# Patient Record
(demographics unavailable — no encounter records)

---

## 2024-12-19 NOTE — PHYSICAL EXAM

## 2024-12-19 NOTE — HISTORY OF PRESENT ILLNESS
[Eats meals with family] : eats meals with family [Has family members/adults to turn to for help] : has family members/adults to turn to for help [Grade: ____] : Grade: [unfilled] [Normal Performance] : normal performance [Normal Behavior/Attention] : normal behavior/attention [Normal Homework] : normal homework [Has friends] : has friends [Has interests/participates in community activities/volunteers] : has interests/participates in community activities/volunteers. [Yes] : Patient goes to dentist yearly [Sleep Concerns] : sleep concerns [Eats regular meals including adequate fruits and vegetables] : eats regular meals including adequate fruits and vegetables [Drinks non-sweetened liquids] : drinks non-sweetened liquids  [NO] : No [Has concerns about body or appearance] : does not have concerns about body or appearance [Uses electronic nicotine delivery system] : does not use electronic nicotine delivery system [Exposure to electronic nicotine delivery system] : no exposure to electronic nicotine delivery system [Uses tobacco] : does not use tobacco [Exposure to tobacco] : no exposure to tobacco [Uses drugs] : does not use drugs  [Exposure to drugs] : no exposure to drugs [Drinks alcohol] : does not drink alcohol [Exposure to alcohol] : no exposure to alcohol [FreeTextEntry8] : last period was 10/2024.  Previously it was regular.  She is sexually active, uses condoms every time except once before the missed period.  She has also been very stressed.  [de-identified] : lives at home with mom [de-identified] : In college remotely for  and then hoping to go to veterinary school. 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  [de-identified] : She denies depression but has been very stressed due to her school work, work schedule, and lack of sleep during regular hours. JONELLE& score: 7 consistnet with mild anxiety [FreeTextEntry1] : last well visit 12/2022.  She takes Dupixent regularly subQ. She is supposed to take it every 2 weeks but she takes in every 4 weeks due to a fear of needles. last dose was yesterday.   last migraine was 2-3 weeks ago, last took rizatriptan over 1 month ago.  Albuterol PRN.  She has had a cold for last 2-3 days.  She is using the albuterol every 4 hours for the last 2-3 days.  she last needed it 2-3 weeks ago.  Peanut and pork allergies.  recently she broke out in hives from seafood.    she is having some knee pain. She has hip dysplasia that has been followed int he past by an outside orthopedist.

## 2024-12-19 NOTE — HISTORY OF PRESENT ILLNESS
[Eats meals with family] : eats meals with family [Has family members/adults to turn to for help] : has family members/adults to turn to for help [Grade: ____] : Grade: [unfilled] [Normal Performance] : normal performance [Normal Behavior/Attention] : normal behavior/attention [Normal Homework] : normal homework [Has friends] : has friends [Has interests/participates in community activities/volunteers] : has interests/participates in community activities/volunteers. [Yes] : Patient goes to dentist yearly [Sleep Concerns] : sleep concerns [Eats regular meals including adequate fruits and vegetables] : eats regular meals including adequate fruits and vegetables [Drinks non-sweetened liquids] : drinks non-sweetened liquids  [NO] : No [Has concerns about body or appearance] : does not have concerns about body or appearance [Uses electronic nicotine delivery system] : does not use electronic nicotine delivery system [Exposure to electronic nicotine delivery system] : no exposure to electronic nicotine delivery system [Exposure to tobacco] : no exposure to tobacco [Uses tobacco] : does not use tobacco [Uses drugs] : does not use drugs  [Exposure to drugs] : no exposure to drugs [Drinks alcohol] : does not drink alcohol [Exposure to alcohol] : no exposure to alcohol [FreeTextEntry8] : last period was 10/2024.  Previously it was regular.  She is sexually active, uses condoms every time except once before the missed period.  She has also been very stressed.  [de-identified] : lives at home with mom [de-identified] : In college remotely for  and then hoping to go to veterinary school. 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  [de-identified] : She denies depression but has been very stressed due to her school work, work schedule, and lack of sleep during regular hours. JONELLE& score: 7 consistnet with mild anxiety [FreeTextEntry1] : last well visit 12/2022.  She takes Dupixent regularly subQ. She is supposed to take it every 2 weeks but she takes in every 4 weeks due to a fear of needles. last dose was yesterday.   last migraine was 2-3 weeks ago, last took rizatriptan over 1 month ago.  Albuterol PRN.  She has had a cold for last 2-3 days.  She is using the albuterol every 4 hours for the last 2-3 days.  she last needed it 2-3 weeks ago.  Peanut and pork allergies.  recently she broke out in hives from seafood.    she is having some knee pain. She has hip dysplasia that has been followed int he past by an outside orthopedist.

## 2024-12-19 NOTE — DISCUSSION/SUMMARY
[Normal Growth] : growth [Normal Development] : development  [No Elimination Concerns] : elimination [Continue Regimen] : feeding [Normal Sleep Pattern] : sleep [Anticipatory Guidance Given] : Anticipatory guidance addressed as per the history of present illness section [Physical Growth and Development] : physical growth and development [Social and Academic Competence] : social and academic competence [Emotional Well-Being] : emotional well-being [Risk Reduction] : risk reduction [Violence and Injury Prevention] : violence and injury prevention [No Vaccines] : no vaccines needed [No Medications] : ~He/She~ is not on any medications [Patient] : patient [Full Activity without restrictions including Physical Education & Athletics] : Full Activity without restrictions including Physical Education & Athletics [FreeTextEntry1] :  18 year old for well care with amenorrhea x3 months after unprotected sex with her boyfriend.   Urine pregnancy test negative.  will send to serum HCG at lab. discussed safe sex, other STI screening declined.  Also discussed stress/anxiety related to her schedule and how that can impact her body.  Advised evaluation with GYN.   return to ortho for knee pain discussed importance of taking Dupixent as prescribed. Schedule follow-up with allergy and dermatology.   Continue balanced diet with all food groups. Brush teeth twice a day with toothbrush. Recommend visit to dentist. Maintain consistent daily routines and sleep schedule. Encourage physical activity.  Social Determinants of Health domains were screened and scored.   labs to be drawn today: HCG, CBC, lipids, TSH, FT4, HgA1c   Return 1 year for routine well child check.

## 2024-12-19 NOTE — RISK ASSESSMENT

## 2024-12-19 NOTE — RISK ASSESSMENT

## 2025-04-30 NOTE — ASSESSMENT
[FreeTextEntry1] : # Atopic dermatitis - chronic, stable today with  Current BSA 5% ---  self-restarted dupixent 9/2023, (prev well-controlled on Dupixent but stopped due to needle phobia) ---- s/p rinvoq 5/2023-9/2023, stopped due to breakthrough rashes (?photodis) and weekly URIs ---- history of extensive systemic allergies s/p immunotherapy/desensitization w/ A/I --- Previous patch testing 2018 (100 patches) showed positive reactions to: Nickel, Cobalt, PPD, Carba Mix, Disperse Blue.  ---- American Core Series (80 patches) 8/2022 with 2 positive rxns: (1) cobalt (2) carmine -- s/p photo patch testing with [facility], recommended stopping allergy medications which she tried, helped but had to restart given in vet school  PLAN: - c/w Dupixent 300 mg subQ every 2 weeks  Risks of medication again reviewed including but not limited to: increased risk of injection site reaction, hypersensitivity reaction, conjunctivitis, keratitis, cold sores. Pt advised to avoid live vaccines while on this medication. - triamcinolone 0.1% ointment BID PRN to AAs, no longer than 2 weeks at a time SED including atrophy, dyspigmentation, telangiectasias, striae. Proper use reviewed including only using to affected area and avoidance of prolonged use. - Encouraged liberal sun protection daily, patient notes she reacts to many sunscreens--recommend EltaMD, Tizo, or LRP tinted mineral  refilled zyrtec per pt request  RTC 6 mos

## 2025-04-30 NOTE — END OF VISIT
[FreeTextEntry3] : All medical record entries made by the Scribe were at my, Angel Luis Renee MD, direction and personally dictated by me on 04/30/2025. I have reviewed the chart and agree that the record accurately reflects my personal performance of the history, physical exam, assessment and plan. I have also personally directed, reviewed, and agreed with the chart. [Time Spent: ___ minutes] : I have spent [unfilled] minutes of time on the encounter which excludes teaching and separately reported services.

## 2025-04-30 NOTE — HISTORY OF PRESENT ILLNESS
[FreeTextEntry1] : Atopic Dermatitis f/u [de-identified] : Ms. MAYRA DIAZ is a 18 y/o F with hx of asthma and allergies here for f/u, accompanied by mom Deep   # Longstanding AD with component of photosensitive dermatitis LV 4/2024. Today, notes she is stable on dupixent and flaring on the arms and neck. Uses triamcinolone for a day at a time PRN. Rash associated with sun did recur last week, applied topical steroids after which it resolved.  Did have photopatch testing with [facility] since LV, notes they recommended stopping allergy medication as that may have been photosensitizing--she did this for a short period which helped but had to restart it as she is in vet school and has pet allergies  HX:  Prev on Rinvoq 15mg PO daily (had needle phobia while on dupi which she did well on, started 5/15/2023), however she self dc'ed this ~ 1-1.5 months ago as she was "breaking out" all over face and body. History suggests hives which she has gotten in the past from A/I immunotherapy, however she was not receiving these anymore but continued to flare. There seemed to be a photosensitive component to some of these breakouts (would happen after spending time outdoors, mostly face and neck) and so A/I recommended stopping rinvoq due to rare reports of photosensitovoty. She was also getting a URI every week.   She continues to develop a rash on face and dorsal hands that seems to be triggered by the sun and follows the exact line of the sweatshirt she was wearing. Using cureology sunscreen which is zinc oxide based. . She is adamant about avoiding contact allergens including medical gloves (tested positive to rubber accelerators). 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   - In 10/23 she self-restarted dupixent 300mg (only 300mg loading) and injects when she flares - continues to inject about every 4 weeks. Using TAC on face, no other topicals. Flaring only on AC fossa today. Has run out of Dupixent. Spoke w [facility] abt photo testing, was told to hold Dupi for 6 weeks. Would like to schedule an appt with them.   Previous patch testing 2018 (100 patches) showed positive reactions to: Nickel, Cobalt, PPD, Carba Mix, Disperse Blue.  American Core Series (80 patches) 8/2022 with 2 positive rxns: (1) cobalt (2) carmine.

## 2025-04-30 NOTE — PHYSICAL EXAM
[Alert] : alert [Oriented x 3] : ~L oriented x 3 [Well Nourished] : well nourished [Declined] : declined [FreeTextEntry3] : eczematous plaques in b/l AC fossae and neck forehead clear

## 2025-04-30 NOTE — DISCUSSION/SUMMARY
[de-identified] : We discussed further treatment options. She continues to have pain despite the surgery and physical therapy. I have recommended obtaining updated imaging of her upper spine with MRI and CT scan. Follow up afterwards.

## 2025-04-30 NOTE — PHYSICAL EXAM
[de-identified] : Examination of the lumbar spine reveals no midline tenderness palpation, step-offs, or skin lesions.  Healed lumbar incision.  Decreased range of motion with respect to flexion, extension, lateral bending, and rotation. No tenderness to palpation of the sciatic notch. No tenderness palpation of the bilateral greater trochanters. No pain with passive internal/external rotation of the hips. No instability of bilateral lower extremities.  Negative ROBERT. Negative straight leg raise bilaterally. No bowstring. Negative femoral stretch. 5 out of 5 iliopsoas, hip abductors, hips adductors, quadriceps, hamstrings, gastrocsoleus, tibialis anterior, extensor hallucis longus, peroneals. Grossly intact sensation to light touch bilateral lower extremities. 1+ patellar and Achilles reflexes. Downgoing Babinski. No clonus. Intact proprioception. Palpable pulses. No skin lesion and no edema on the right and left lower extremities. [de-identified] : Review of her lumbar X-rays reveals prior L5 pars repair.

## 2025-04-30 NOTE — ADDENDUM
[FreeTextEntry1] : I, Nicolas Acosta, documented this note as a scribe on behalf of Angel Luis Renee MD on 04/30/2025.

## 2025-04-30 NOTE — HISTORY OF PRESENT ILLNESS
[FreeTextEntry1] : Atopic Dermatitis f/u [de-identified] : Ms. MAYRA DIAZ is a 18 y/o F with hx of asthma and allergies here for f/u, accompanied by mom Deep   # Longstanding AD with component of photosensitive dermatitis LV 4/2024. Today, notes she is stable on dupixent and flaring on the arms and neck. Uses triamcinolone for a day at a time PRN. Rash associated with sun did recur last week, applied topical steroids after which it resolved.  Did have photopatch testing with [facility] since LV, notes they recommended stopping allergy medication as that may have been photosensitizing--she did this for a short period which helped but had to restart it as she is in vet school and has pet allergies  HX:  Prev on Rinvoq 15mg PO daily (had needle phobia while on dupi which she did well on, started 5/15/2023), however she self dc'ed this ~ 1-1.5 months ago as she was "breaking out" all over face and body. History suggests hives which she has gotten in the past from A/I immunotherapy, however she was not receiving these anymore but continued to flare. There seemed to be a photosensitive component to some of these breakouts (would happen after spending time outdoors, mostly face and neck) and so A/I recommended stopping rinvoq due to rare reports of photosensitovoty. She was also getting a URI every week.   She continues to develop a rash on face and dorsal hands that seems to be triggered by the sun and follows the exact line of the sweatshirt she was wearing. Using cureology sunscreen which is zinc oxide based. . She is adamant about avoiding contact allergens including medical gloves (tested positive to rubber accelerators). 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   - In 10/23 she self-restarted dupixent 300mg (only 300mg loading) and injects when she flares - continues to inject about every 4 weeks. Using TAC on face, no other topicals. Flaring only on AC fossa today. Has run out of Dupixent. Spoke w [facility] abt photo testing, was told to hold Dupi for 6 weeks. Would like to schedule an appt with them.   Previous patch testing 2018 (100 patches) showed positive reactions to: Nickel, Cobalt, PPD, Carba Mix, Disperse Blue.  American Core Series (80 patches) 8/2022 with 2 positive rxns: (1) cobalt (2) carmine.

## 2025-04-30 NOTE — HISTORY OF PRESENT ILLNESS
[FreeTextEntry1] : Atopic Dermatitis f/u [de-identified] : Ms. MAYRA DIAZ is a 20 y/o F with hx of asthma and allergies here for f/u, accompanied by mom Deep   # Longstanding AD with component of photosensitive dermatitis LV 4/2024. Today, notes she is stable on dupixent and flaring on the arms and neck. Uses triamcinolone for a day at a time PRN. Rash associated with sun did recur last week, applied topical steroids after which it resolved.  Did have photopatch testing with [facility] since LV, notes they recommended stopping allergy medication as that may have been photosensitizing--she did this for a short period which helped but had to restart it as she is in vet school and has pet allergies  HX:  Prev on Rinvoq 15mg PO daily (had needle phobia while on dupi which she did well on, started 5/15/2023), however she self dc'ed this ~ 1-1.5 months ago as she was "breaking out" all over face and body. History suggests hives which she has gotten in the past from A/I immunotherapy, however she was not receiving these anymore but continued to flare. There seemed to be a photosensitive component to some of these breakouts (would happen after spending time outdoors, mostly face and neck) and so A/I recommended stopping rinvoq due to rare reports of photosensitovoty. She was also getting a URI every week.   She continues to develop a rash on face and dorsal hands that seems to be triggered by the sun and follows the exact line of the sweatshirt she was wearing. Using cureology sunscreen which is zinc oxide based. . She is adamant about avoiding contact allergens including medical gloves (tested positive to rubber accelerators). 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   - In 10/23 she self-restarted dupixent 300mg (only 300mg loading) and injects when she flares - continues to inject about every 4 weeks. Using TAC on face, no other topicals. Flaring only on AC fossa today. Has run out of Dupixent. Spoke w [facility] abt photo testing, was told to hold Dupi for 6 weeks. Would like to schedule an appt with them.   Previous patch testing 2018 (100 patches) showed positive reactions to: Nickel, Cobalt, PPD, Carba Mix, Disperse Blue.  American Core Series (80 patches) 8/2022 with 2 positive rxns: (1) cobalt (2) carmine.

## 2025-04-30 NOTE — HISTORY OF PRESENT ILLNESS
[de-identified] : Ms. MAYRA DIAZ  is a 19 year old female who presents with a chronic history of low back pain.  She a L5-S1 fusion in 2019 which helped for a few months and then her pain returned.  She did PT a few years ago for her hip and back with minimal relief.  She will take advil as needed.  Denies any LE radicular symptoms.  Normal bowel and bladder control.   Denies any recent fevers, chills, sweats, weight loss, or infection.  The patients past medical history, past surgical history, medications, allergies, and social history were reviewed by me today with the patient and documented accordingly.  In addition, the patient's family history, which is noncontributory to their visit, was also reviewed.
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
